# Patient Record
Sex: FEMALE
[De-identification: names, ages, dates, MRNs, and addresses within clinical notes are randomized per-mention and may not be internally consistent; named-entity substitution may affect disease eponyms.]

---

## 2018-10-28 PROBLEM — Z00.00 ENCOUNTER FOR PREVENTIVE HEALTH EXAMINATION: Status: ACTIVE | Noted: 2018-10-28

## 2018-11-27 ENCOUNTER — APPOINTMENT (OUTPATIENT)
Dept: OBGYN | Facility: CLINIC | Age: 38
End: 2018-11-27
Payer: COMMERCIAL

## 2018-11-27 PROCEDURE — 99203 OFFICE O/P NEW LOW 30 MIN: CPT

## 2019-10-16 ENCOUNTER — APPOINTMENT (OUTPATIENT)
Dept: OBGYN | Facility: CLINIC | Age: 39
End: 2019-10-16
Payer: COMMERCIAL

## 2019-10-16 PROCEDURE — 99395 PREV VISIT EST AGE 18-39: CPT

## 2020-01-09 ENCOUNTER — APPOINTMENT (OUTPATIENT)
Dept: OBGYN | Facility: CLINIC | Age: 40
End: 2020-01-09
Payer: COMMERCIAL

## 2020-01-09 PROCEDURE — 99213 OFFICE O/P EST LOW 20 MIN: CPT

## 2020-01-13 ENCOUNTER — FORM ENCOUNTER (OUTPATIENT)
Age: 40
End: 2020-01-13

## 2021-02-10 ENCOUNTER — FORM ENCOUNTER (OUTPATIENT)
Age: 41
End: 2021-02-10

## 2021-02-11 ENCOUNTER — FORM ENCOUNTER (OUTPATIENT)
Age: 41
End: 2021-02-11

## 2021-02-11 ENCOUNTER — APPOINTMENT (OUTPATIENT)
Dept: OBGYN | Facility: CLINIC | Age: 41
End: 2021-02-11
Payer: COMMERCIAL

## 2021-02-11 PROCEDURE — 99072 ADDL SUPL MATRL&STAF TM PHE: CPT

## 2021-02-11 PROCEDURE — 99395 PREV VISIT EST AGE 18-39: CPT

## 2021-02-22 ENCOUNTER — FORM ENCOUNTER (OUTPATIENT)
Age: 41
End: 2021-02-22

## 2021-02-28 ENCOUNTER — FORM ENCOUNTER (OUTPATIENT)
Age: 41
End: 2021-02-28

## 2021-11-18 DIAGNOSIS — Z98.890 OTHER SPECIFIED POSTPROCEDURAL STATES: ICD-10-CM

## 2021-11-18 DIAGNOSIS — E05.00 THYROTOXICOSIS WITH DIFFUSE GOITER W/OUT THYROTOXIC CRISIS OR STORM: ICD-10-CM

## 2021-11-18 DIAGNOSIS — Z78.9 OTHER SPECIFIED HEALTH STATUS: ICD-10-CM

## 2021-11-18 DIAGNOSIS — Z80.3 FAMILY HISTORY OF MALIGNANT NEOPLASM OF BREAST: ICD-10-CM

## 2021-11-18 RX ORDER — LEVOTHYROXINE SODIUM 0.09 MG/1
88 TABLET ORAL
Refills: 0 | Status: ACTIVE | COMMUNITY

## 2022-03-16 ENCOUNTER — APPOINTMENT (OUTPATIENT)
Dept: OBGYN | Facility: CLINIC | Age: 42
End: 2022-03-16
Payer: COMMERCIAL

## 2022-03-16 VITALS
SYSTOLIC BLOOD PRESSURE: 124 MMHG | WEIGHT: 178 LBS | BODY MASS INDEX: 29.66 KG/M2 | HEIGHT: 65 IN | DIASTOLIC BLOOD PRESSURE: 84 MMHG | HEART RATE: 74 BPM

## 2022-03-16 DIAGNOSIS — Z12.39 ENCOUNTER FOR OTHER SCREENING FOR MALIGNANT NEOPLASM OF BREAST: ICD-10-CM

## 2022-03-16 DIAGNOSIS — Z01.419 ENCOUNTER FOR GYNECOLOGICAL EXAMINATION (GENERAL) (ROUTINE) W/OUT ABNORMAL FINDINGS: ICD-10-CM

## 2022-03-16 DIAGNOSIS — R92.2 INCONCLUSIVE MAMMOGRAM: ICD-10-CM

## 2022-03-16 PROCEDURE — 99395 PREV VISIT EST AGE 18-39: CPT

## 2022-03-29 LAB
CYTOLOGY CVX/VAG DOC THIN PREP: ABNORMAL
HPV HIGH+LOW RISK DNA PNL CVX: NOT DETECTED

## 2022-03-29 NOTE — END OF VISIT
[FreeTextEntry3] : I, Leah Flynn, acted as a scribe on behalf for Dr. Patricia Saavedra on 03/16/2022.\par \par All medical entries made by the scribe were at my, Dr. Patricia Saavedra, direction and personally dictated by me on 03/16/2022. I have reviewed the chart and agree that the record accurately reflects my personal performance of the history, physical exam, assessment, and plan. I have personally directed, reviewed, and agreed with the chart.

## 2022-03-29 NOTE — HISTORY OF PRESENT ILLNESS
[Patient reported PAP Smear was normal] : Patient reported PAP Smear was normal [FreeTextEntry1] : 42 y/o , LMP 3/2/22, presents today for annual exam. Pt is doing well and offers no complaints. Pt reports occasional episodes of hypothyroidism significant for fatigue, mood changes, but her levothyroxine dose was increased and she has been feeling better. \par \par ObHx:  w/ rescue vaginal cerclage was on oral progesterone first trimester\par PMHx: Grave's disease (euthyroid currently) - went to radiation \par Medications: Levothyroxine 137 mcg\par FHx: breast cancer (maternal aunt)\par  [PapSmeardate] : 2021

## 2022-03-29 NOTE — PLAN
[FreeTextEntry1] : 42 y/o , LMP 3/2/22, annual exam\par \par HCM\par -pap done today\par -vit D/calcium/exercise\par -rx for breast mammo/sono\par -colon screening reviewed (begin at 47)\par -f/u PCP for annual and appropriate immunizations\par -rto 1 year \par \par Egg freezing\par -pt given referral for Dr. Mariposa Gastelum for egg retrieval

## 2023-05-25 ENCOUNTER — APPOINTMENT (OUTPATIENT)
Dept: OBGYN | Facility: CLINIC | Age: 43
End: 2023-05-25
Payer: COMMERCIAL

## 2023-05-25 VITALS
HEIGHT: 65 IN | WEIGHT: 164 LBS | HEART RATE: 68 BPM | BODY MASS INDEX: 27.32 KG/M2 | SYSTOLIC BLOOD PRESSURE: 117 MMHG | DIASTOLIC BLOOD PRESSURE: 80 MMHG | OXYGEN SATURATION: 100 %

## 2023-05-25 DIAGNOSIS — Z11.51 ENCOUNTER FOR SCREENING FOR HUMAN PAPILLOMAVIRUS (HPV): ICD-10-CM

## 2023-05-25 PROCEDURE — 99396 PREV VISIT EST AGE 40-64: CPT

## 2023-05-31 NOTE — PLAN
[FreeTextEntry1] : 42 y/o presents for annual exam. \par \par -PAP/HPV done today\par -declines sti testing\par -rx mammo/sono\par -RTO in 1 year

## 2023-05-31 NOTE — END OF VISIT
[FreeTextEntry2] : I, Karie Nunez, acted as a scribe on behalf of Dr. Patricia Saavedra on 05/25/2023 .\par \par All medical entries made by the scribe were at my, Dr. Patricia Saavedra, direction and personally dictated by me on 05/25/2023 . I have reviewed the chart and agree that the record accurately reflects my personal performance of the history, physical exam, assessment and plan. I have also personally directed, reviewed, and agreed with the chart.\par

## 2023-05-31 NOTE — HISTORY OF PRESENT ILLNESS
[FreeTextEntry1] : 42 y/o  LMP 23 presents for annual exam. She is on Levothyroxine dosing 137 mcg. Her cycles are occasionally heavy, but overall good. She reports no breast pain in between cycles. Pt saw her ENDO on 05/10, TSH 0.5, VItamin D 49, Hg A1C 5.2, hemoglobin 12, CMP wnl. Her daughter is 11 y/o. She is staying within the [place] this Summer. \par \par ObHx:  w/ rescue vaginal cerclage was on oral progesterone first trimester\par PMHx: Grave's disease (euthyroid currently) - went to radiation \par Meds: Levothyroxine 137 mcg\par FamHx: breast cancer (maternal aunt)\par  [PapSmeardate] : 03/2022	 [TextBox_31] : octavio, BV

## 2023-06-23 DIAGNOSIS — R92.2 INCONCLUSIVE MAMMOGRAM: ICD-10-CM

## 2023-07-27 ENCOUNTER — APPOINTMENT (OUTPATIENT)
Dept: OBGYN | Facility: CLINIC | Age: 43
End: 2023-07-27
Payer: COMMERCIAL

## 2023-07-27 DIAGNOSIS — N64.89 OTHER SPECIFIED DISORDERS OF BREAST: ICD-10-CM

## 2023-07-27 LAB
CYTOLOGY CVX/VAG DOC THIN PREP: NORMAL
HPV HIGH+LOW RISK DNA PNL CVX: NOT DETECTED

## 2023-07-27 PROCEDURE — 99443: CPT

## 2023-08-07 DIAGNOSIS — N63.10 UNSPECIFIED LUMP IN THE RIGHT BREAST, UNSPECIFIED QUADRANT: ICD-10-CM

## 2024-08-16 ENCOUNTER — APPOINTMENT (OUTPATIENT)
Dept: OBGYN | Facility: CLINIC | Age: 44
End: 2024-08-16

## 2024-12-04 ENCOUNTER — NON-APPOINTMENT (OUTPATIENT)
Age: 44
End: 2024-12-04

## 2024-12-04 ENCOUNTER — APPOINTMENT (OUTPATIENT)
Dept: OBGYN | Facility: CLINIC | Age: 44
End: 2024-12-04
Payer: COMMERCIAL

## 2024-12-04 VITALS
BODY MASS INDEX: 26.33 KG/M2 | HEIGHT: 65 IN | DIASTOLIC BLOOD PRESSURE: 49 MMHG | WEIGHT: 158 LBS | SYSTOLIC BLOOD PRESSURE: 89 MMHG

## 2024-12-04 PROCEDURE — 99396 PREV VISIT EST AGE 40-64: CPT

## 2024-12-12 LAB
CYTOLOGY CVX/VAG DOC THIN PREP: NORMAL
HPV HIGH+LOW RISK DNA PNL CVX: NOT DETECTED